# Patient Record
Sex: FEMALE | Race: WHITE | HISPANIC OR LATINO | Employment: FULL TIME | URBAN - METROPOLITAN AREA
[De-identification: names, ages, dates, MRNs, and addresses within clinical notes are randomized per-mention and may not be internally consistent; named-entity substitution may affect disease eponyms.]

---

## 2020-11-11 ENCOUNTER — HOSPITAL ENCOUNTER (OUTPATIENT)
Facility: MEDICAL CENTER | Age: 40
End: 2020-11-11
Attending: PHYSICIAN ASSISTANT
Payer: COMMERCIAL

## 2020-11-11 ENCOUNTER — OFFICE VISIT (OUTPATIENT)
Dept: URGENT CARE | Facility: CLINIC | Age: 40
End: 2020-11-11
Payer: COMMERCIAL

## 2020-11-11 VITALS
WEIGHT: 180 LBS | SYSTOLIC BLOOD PRESSURE: 128 MMHG | HEART RATE: 73 BPM | DIASTOLIC BLOOD PRESSURE: 75 MMHG | OXYGEN SATURATION: 99 % | BODY MASS INDEX: 29.99 KG/M2 | HEIGHT: 65 IN | TEMPERATURE: 97.6 F | RESPIRATION RATE: 18 BRPM

## 2020-11-11 DIAGNOSIS — Z20.822 CLOSE EXPOSURE TO COVID-19 VIRUS: ICD-10-CM

## 2020-11-11 DIAGNOSIS — R09.81 NASAL CONGESTION: ICD-10-CM

## 2020-11-11 DIAGNOSIS — R51.9 NONINTRACTABLE HEADACHE, UNSPECIFIED CHRONICITY PATTERN, UNSPECIFIED HEADACHE TYPE: ICD-10-CM

## 2020-11-11 DIAGNOSIS — R05.9 COUGH: ICD-10-CM

## 2020-11-11 LAB
COVID ORDER STATUS COVID19: NORMAL
FLUAV+FLUBV AG SPEC QL IA: NEGATIVE
INT CON NEG: NORMAL
INT CON POS: NORMAL
SARS-COV-2 RNA RESP QL NAA+PROBE: DETECTED
SPECIMEN SOURCE: ABNORMAL

## 2020-11-11 PROCEDURE — 99204 OFFICE O/P NEW MOD 45 MIN: CPT | Mod: CS | Performed by: PHYSICIAN ASSISTANT

## 2020-11-11 PROCEDURE — 87804 INFLUENZA ASSAY W/OPTIC: CPT | Performed by: PHYSICIAN ASSISTANT

## 2020-11-11 PROCEDURE — U0003 INFECTIOUS AGENT DETECTION BY NUCLEIC ACID (DNA OR RNA); SEVERE ACUTE RESPIRATORY SYNDROME CORONAVIRUS 2 (SARS-COV-2) (CORONAVIRUS DISEASE [COVID-19]), AMPLIFIED PROBE TECHNIQUE, MAKING USE OF HIGH THROUGHPUT TECHNOLOGIES AS DESCRIBED BY CMS-2020-01-R: HCPCS

## 2020-11-11 ASSESSMENT — ENCOUNTER SYMPTOMS
COUGH: 1
FEVER: 0
SORE THROAT: 0
PALPITATIONS: 0
DIARRHEA: 0
SHORTNESS OF BREATH: 0
HEADACHES: 1
CHILLS: 0
SENSORY CHANGE: 0
WHEEZING: 0
VOMITING: 0
SPUTUM PRODUCTION: 0

## 2020-11-11 NOTE — PATIENT INSTRUCTIONS

## 2020-11-11 NOTE — PROGRESS NOTES
Subjective:      Meenu Song is a 40 y.o. female who presents with Cough (x2 days ), Headache, and Congestion    PMH:  has no past medical history on file.  MEDS: No current outpatient medications on file.  ALLERGIES: No Known Allergies  SURGHX: History reviewed. No pertinent surgical history.  SOCHX:  reports that she has never smoked. She has never used smokeless tobacco. She reports current alcohol use. She reports that she does not use drugs.  FH: Reviewed with patient, not pertinent to this visit.           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  Patient's son who also has symptoms, had his school closed because of high Covid count and the students.    Cough  This is a new problem. Episode onset: 2 days ago. The problem has been gradually worsening. The problem occurs every few minutes. The cough is non-productive. Associated symptoms include headaches and nasal congestion. Pertinent negatives include no chest pain, chills, fever, sore throat, shortness of breath or wheezing. Exacerbated by: Exertion, cold air. Treatments tried: Mucinex. The treatment provided mild relief. There is no history of asthma, bronchitis or environmental allergies.   Headache   Associated symptoms include coughing. Pertinent negatives include no fever, sore throat or vomiting.       Review of Systems   Constitutional: Negative for chills and fever.   HENT: Positive for congestion. Negative for sore throat.    Respiratory: Positive for cough. Negative for sputum production, shortness of breath and wheezing.    Cardiovascular: Negative for chest pain, palpitations and leg swelling.   Gastrointestinal: Negative for diarrhea and vomiting.   Neurological: Positive for headaches. Negative for sensory change.   Endo/Heme/Allergies: Negative for environmental allergies.   All other systems reviewed and are negative.         Objective:     /75    "Pulse 73   Temp 36.4 °C (97.6 °F) (Temporal)   Resp 18   Ht 1.651 m (5' 5\")   Wt 81.6 kg (180 lb)   SpO2 99%   Breastfeeding No   BMI 29.95 kg/m²      Physical Exam  Vitals signs and nursing note reviewed.   Constitutional:       General: She is not in acute distress.     Appearance: Normal appearance. She is well-developed and normal weight. She is not ill-appearing or toxic-appearing.   HENT:      Head: Normocephalic and atraumatic.      Right Ear: Tympanic membrane normal.      Left Ear: Tympanic membrane normal.      Nose: Mucosal edema, congestion and rhinorrhea present.      Mouth/Throat:      Mouth: Mucous membranes are moist.      Pharynx: Oropharynx is clear. Uvula midline.   Eyes:      Extraocular Movements: Extraocular movements intact.      Conjunctiva/sclera: Conjunctivae normal.      Pupils: Pupils are equal, round, and reactive to light.   Neck:      Musculoskeletal: Normal range of motion and neck supple.   Cardiovascular:      Rate and Rhythm: Normal rate and regular rhythm.      Pulses: Normal pulses.      Heart sounds: Normal heart sounds.   Pulmonary:      Effort: Pulmonary effort is normal. No respiratory distress.      Breath sounds: Normal breath sounds. No rhonchi.   Abdominal:      Palpations: Abdomen is soft.   Musculoskeletal: Normal range of motion.   Skin:     General: Skin is warm and dry.      Capillary Refill: Capillary refill takes less than 2 seconds.   Neurological:      General: No focal deficit present.      Mental Status: She is alert and oriented to person, place, and time.      Gait: Gait normal.   Psychiatric:         Mood and Affect: Mood normal.         Behavior: Behavior is cooperative.            Flu: neg     Assessment/Plan:         1. Nonintractable headache, unspecified chronicity pattern, unspecified headache type  POCT Influenza A/B    COVID/SARS COV-2 PCR   2. Cough  POCT Influenza A/B    COVID/SARS COV-2 PCR   3. Nasal congestion  POCT Influenza A/B    " COVID/SARS COV-2 PCR   4. Close exposure to COVID-19 virus  POCT Influenza A/B    COVID/SARS COV-2 PCR     Per protocol for PUI/ISO patients, the patient was evaluated by me while I was wearing PPE.  Per CDC guidelines, patient has been instructed to self quarantine at home for at least 10 days from onset of symptoms and at least 3 full days after resolution of fever/respiratory symptoms.  PT verbalized agreement to do so.      PT can continue OTC medications, increase fluids and rest until symptoms improve.     Discussed that I felt this was viral in nature. Did not see any evidence of a bacterial process. Discussed natural progression and sx care.    PT should follow up with PCP in 1-2 days for re-evaluation if symptoms have not improved.  Discussed red flags and reasons to return to UC or ED.  Pt and/or family verbalized understanding of diagnosis and follow up instructions and was offered informational handout on diagnosis.  PT discharged.

## 2020-11-12 ENCOUNTER — TELEPHONE (OUTPATIENT)
Dept: URGENT CARE | Facility: CLINIC | Age: 40
End: 2020-11-12

## 2020-11-12 DIAGNOSIS — U07.1 COVID-19 VIRUS INFECTION: ICD-10-CM

## 2020-11-12 NOTE — TELEPHONE ENCOUNTER
I spoke with patient this morning regarding her positive Covid test results.  I will also release her Covid test results to her my chart so she can print them off and for them to her employer.  Patient was reminded that Covid isolation is 14 days with a positive Covid test.    Patient was reminded that if she has any worsening of her symptoms she should return for reevaluation.Patient verbalized understanding of these instructions.      Patient was  instructed that her son also had positive Covid test results.  I informed her that I would release his results to his MyChart as there is a proxy in existence for him.

## 2023-08-22 ENCOUNTER — HOSPITAL ENCOUNTER (OUTPATIENT)
Facility: MEDICAL CENTER | Age: 43
End: 2023-08-22
Payer: COMMERCIAL

## 2023-08-22 ENCOUNTER — OFFICE VISIT (OUTPATIENT)
Dept: URGENT CARE | Facility: CLINIC | Age: 43
End: 2023-08-22
Payer: COMMERCIAL

## 2023-08-22 VITALS
SYSTOLIC BLOOD PRESSURE: 110 MMHG | HEART RATE: 70 BPM | OXYGEN SATURATION: 99 % | WEIGHT: 180 LBS | BODY MASS INDEX: 29.99 KG/M2 | TEMPERATURE: 97.8 F | HEIGHT: 65 IN | DIASTOLIC BLOOD PRESSURE: 70 MMHG | RESPIRATION RATE: 18 BRPM

## 2023-08-22 DIAGNOSIS — R68.89 FLU-LIKE SYMPTOMS: ICD-10-CM

## 2023-08-22 DIAGNOSIS — J02.9 PHARYNGITIS, UNSPECIFIED ETIOLOGY: ICD-10-CM

## 2023-08-22 DIAGNOSIS — J02.9 PHARYNGITIS, UNSPECIFIED ETIOLOGY: Primary | ICD-10-CM

## 2023-08-22 LAB
FLUAV RNA SPEC QL NAA+PROBE: NEGATIVE
FLUBV RNA SPEC QL NAA+PROBE: NEGATIVE
RSV RNA SPEC QL NAA+PROBE: NEGATIVE
S PYO DNA SPEC NAA+PROBE: NOT DETECTED
SARS-COV-2 RNA RESP QL NAA+PROBE: NEGATIVE

## 2023-08-22 PROCEDURE — 99213 OFFICE O/P EST LOW 20 MIN: CPT

## 2023-08-22 PROCEDURE — 87077 CULTURE AEROBIC IDENTIFY: CPT

## 2023-08-22 PROCEDURE — 87070 CULTURE OTHR SPECIMN AEROBIC: CPT

## 2023-08-22 PROCEDURE — 0241U POCT CEPHEID COV-2, FLU A/B, RSV - PCR: CPT

## 2023-08-22 PROCEDURE — 3078F DIAST BP <80 MM HG: CPT

## 2023-08-22 PROCEDURE — 3074F SYST BP LT 130 MM HG: CPT

## 2023-08-22 PROCEDURE — 87651 STREP A DNA AMP PROBE: CPT

## 2023-08-22 RX ORDER — MELOXICAM 15 MG/1
TABLET ORAL
COMMUNITY
Start: 2023-07-07

## 2023-08-22 ASSESSMENT — ENCOUNTER SYMPTOMS
NAUSEA: 0
FEVER: 1
WHEEZING: 0
SINUS PAIN: 0
COUGH: 0
DIZZINESS: 0
MYALGIAS: 1
SORE THROAT: 1
SPUTUM PRODUCTION: 1
SHORTNESS OF BREATH: 0
BLURRED VISION: 0
TINGLING: 0
HEADACHES: 0
CHILLS: 1
PALPITATIONS: 0
DEPRESSION: 0
EYE DISCHARGE: 0
STRIDOR: 0
NERVOUS/ANXIOUS: 0
FOCAL WEAKNESS: 0
VOMITING: 0
ABDOMINAL PAIN: 0
EYE REDNESS: 0
EYE PAIN: 0
DIARRHEA: 0

## 2023-08-22 NOTE — LETTER
August 22, 2023    To Whom It May Concern:         This is confirmation that Meenu Song attended her scheduled appointment with ALEX Rain on 8/22/23.         If you have any questions please do not hesitate to call me at the phone number listed below.    Sincerely,          JULES Rain.  [phone]

## 2023-08-22 NOTE — PROGRESS NOTES
Subjective:   Meenu Song is a 43 y.o. female who presents for Laryngitis (Cough, raspy voice x 3 days sore throat, green/yellow discharge and phlegm )      HPI:  Meenu comes in today c/o sore throat, cough, body aches, headache. Onset was 3 days ago Patient describes symptoms as intermittent . They describe the pain as body aches, headache. No aggravating or relieving factors identified. Treatments tried at home include OTC Theraflu with some relief. They describe their symptoms as mild.      Review of Systems   Constitutional:  Positive for chills, fever and malaise/fatigue.   HENT:  Positive for congestion and sore throat. Negative for ear discharge, ear pain, hearing loss, sinus pain and tinnitus.    Eyes:  Negative for blurred vision, pain, discharge and redness.   Respiratory:  Positive for sputum production. Negative for cough, shortness of breath, wheezing and stridor.    Cardiovascular:  Negative for chest pain and palpitations.   Gastrointestinal:  Negative for abdominal pain, diarrhea, nausea and vomiting.   Genitourinary:  Negative for dysuria.   Musculoskeletal:  Positive for myalgias.   Skin:  Negative for rash.   Neurological:  Negative for dizziness, tingling, focal weakness and headaches.   Psychiatric/Behavioral:  Negative for depression. The patient is not nervous/anxious.        Medications:    COUGH DROPS MT  meloxicam  THERAFLU COLD & COUGH PO    Allergies: Patient has no known allergies.    Problem List: Meenu Song does not have a problem list on file.    Surgical History:  No past surgical history on file.    Past Social Hx: Meenu Song  reports that she has never smoked. She has never used smokeless tobacco. She reports that she does not currently use alcohol. She reports that she does not use drugs.     Past Family Hx:  Meenu Song family history is not on file.     Problem list, medications, and allergies reviewed by myself today in Epic.      "Objective:     /70 (BP Location: Left arm, Patient Position: Sitting, BP Cuff Size: Adult)   Pulse 70   Temp 36.6 °C (97.8 °F) (Temporal)   Resp 18   Ht 1.651 m (5' 5\")   Wt 81.6 kg (180 lb)   SpO2 99%   BMI 29.95 kg/m²     Physical Exam  Vitals reviewed.   Constitutional:       General: She is not in acute distress.     Appearance: Normal appearance. She is not toxic-appearing.   HENT:      Head: Normocephalic and atraumatic.      Right Ear: Tympanic membrane, ear canal and external ear normal. There is no impacted cerumen.      Left Ear: Tympanic membrane, ear canal and external ear normal. There is no impacted cerumen.      Nose: Congestion and rhinorrhea present.      Mouth/Throat:      Mouth: Mucous membranes are moist.      Pharynx: Oropharyngeal exudate and posterior oropharyngeal erythema present.   Eyes:      General: No scleral icterus.        Right eye: No discharge.         Left eye: Discharge present.     Conjunctiva/sclera: Conjunctivae normal.   Cardiovascular:      Rate and Rhythm: Normal rate and regular rhythm.      Heart sounds: Normal heart sounds. No murmur heard.     No friction rub. No gallop.   Pulmonary:      Effort: Pulmonary effort is normal.      Breath sounds: Normal breath sounds. No stridor. No wheezing, rhonchi or rales.   Abdominal:      General: There is no distension.   Musculoskeletal:         General: Normal range of motion.      Cervical back: Normal range of motion. No rigidity.   Skin:     General: Skin is warm and dry.      Capillary Refill: Capillary refill takes less than 2 seconds.   Neurological:      Mental Status: She is alert and oriented to person, place, and time.   Psychiatric:         Mood and Affect: Mood normal.         Behavior: Behavior normal.       Lab Results/POC Test Results   Results for orders placed or performed in visit on 08/22/23   POCT CEPHEID GROUP A STREP - PCR   Result Value Ref Range    POC Group A Strep, PCR Not Detected Not " Detected, Invalid   POCT CEPHEID COV-2, FLU A/B, RSV - PCR   Result Value Ref Range    SARS-CoV-2 by PCR Negative Negative, Invalid    Influenza virus A RNA Negative Negative, Invalid    Influenza virus B, PCR Negative Negative, Invalid    RSV, PCR Negative Negative, Invalid             Assessment/Plan:     Diagnosis and associated orders:     No diagnosis found.   Comments/MDM:     1. Pharyngitis, unspecified etiology  Patient complains of pharyngitis, posterior oropharynx does have erythema and some exudate so I will run a strep PCR.  This did come back negative, I will send a throat culture and call patient with results.  - POCT CEPHEID GROUP A STREP - PCR  - CULTURE THROAT; Future    2. Flu-like symptoms  COVID, influenza, RSV PCR are all negative.  I did call the patient with the results, and we discussed viral upper respiratory infection and supportive care including Tylenol/ibuprofen for fever for fever and body aches, OTC cough suppressant antihistamine and nasal decongestant such as the TheraFlu that she is already taking.  If she does take any other OTC remedies, be certain to check for Tylenol content and do not exceed 3000 mg of Tylenol in a 24-hour period.  I did advise her to gargle with salt water to help the sore throat, use a humidifier in her room at night, and she may use some OTC Cepacol throat lozenges with benzocaine for pain relief if needed.  - POCT CEPHEID GROUP A STREP - PCR  - POCT CEPHEID COV-2, FLU A/B, RSV - PCR         Pt is clinically stable at today's acute urgent care visit.  No acute distress noted. Appropriate for outpatient management at this time.       Discussed DDx, management options (risks,benefits, and alternatives to planned treatment), natural progression and supportive care.  Patient states they have good understanding and the treatment plan was agreed upon. Questions were encouraged and answered   Return to urgent care prn if new or worsening sx or if there is no  improvement in condition prn.    Advised the patient to follow-up with the primary care physician for recheck, reevaluation, and consideration of further management.  Strict ER precautions discussed for any difficulty breathing, difficulty swallowing.  Patient states they understand all instructions.     I personally reviewed prior external notes and test results pertinent to today's visit.  I have independently reviewed and interpreted all diagnostics ordered during this urgent care acute visit.        Please note that this dictation was created using voice recognition software. I have made a reasonable attempt to correct obvious errors, but I expect that there are errors of grammar and possibly content that I did not discover before finalizing the note.    This note was electronically signed by MERCEDES Ballesteros

## 2023-08-24 LAB
BACTERIA SPEC RESP CULT: NORMAL
SIGNIFICANT IND 70042: NORMAL
SITE SITE: NORMAL
SOURCE SOURCE: NORMAL